# Patient Record
Sex: MALE | Race: WHITE | NOT HISPANIC OR LATINO
[De-identification: names, ages, dates, MRNs, and addresses within clinical notes are randomized per-mention and may not be internally consistent; named-entity substitution may affect disease eponyms.]

---

## 2019-11-04 ENCOUNTER — APPOINTMENT (OUTPATIENT)
Dept: RADIOLOGY | Facility: HOSPITAL | Age: 69
End: 2019-11-04
Payer: COMMERCIAL

## 2019-11-04 ENCOUNTER — OUTPATIENT (OUTPATIENT)
Dept: OUTPATIENT SERVICES | Facility: HOSPITAL | Age: 69
LOS: 1 days | Discharge: HOME | End: 2019-11-04

## 2019-11-04 ENCOUNTER — OUTPATIENT (OUTPATIENT)
Dept: OUTPATIENT SERVICES | Facility: HOSPITAL | Age: 69
LOS: 1 days | End: 2019-11-04

## 2019-11-04 ENCOUNTER — APPOINTMENT (OUTPATIENT)
Dept: SPEECH THERAPY | Facility: HOSPITAL | Age: 69
End: 2019-11-04
Payer: COMMERCIAL

## 2019-11-04 DIAGNOSIS — G72.41 INCLUSION BODY MYOSITIS [IBM]: ICD-10-CM

## 2019-11-04 PROBLEM — Z00.00 ENCOUNTER FOR PREVENTIVE HEALTH EXAMINATION: Status: ACTIVE | Noted: 2019-11-04

## 2019-11-04 PROCEDURE — 74230 X-RAY XM SWLNG FUNCJ C+: CPT | Mod: 26

## 2019-11-04 NOTE — PLAN
[FreeTextEntry2] : \par 1.) Soft Solids with Thin Liquids\par 2.) Feeding/Swallowing Guidelines: Sit upright, small bites, chew soft solids well, single cup sips of thin liquids; two swallows per bite; alternate with a liquid wash after every bite; reswallow per every sip. \par 3.) Aspiration Precautions\par 4.) Maintain Good Oral Hygiene Care\par 5.) Follow up with referring Physician\par 6.) Consider swallowing therapy to maximize swallow mechanism \par 7.) Crush PO meds if not contraindicated with applesauce or consider liquid form if applicable. \par \par SLP provided Patient/Wife education regarding preliminary results. Both verbalized understanding and will follow up with Physician for medical management. \par

## 2019-11-04 NOTE — ASSESSMENT
[FreeTextEntry1] : Patient presents with Functional Oral and Mild to Moderate Pharyngeal Stage Dysphagia. The Oral Stage is characterized by adequate oral containment, adequate chewing for solid, adequate bolus manipulation, adequate tongue motion with adequate anterior to posterior transfer of the bolus; piecemeal transfer and deglutition for solids; with adequate oral clearance post swallow.    The Pharyngeal Stage is characterized by adequate initiation of the pharyngeal swallow, adequate laryngeal elevation, reduced tongue base retraction resulting in moderate vallecular residue post primary swallow for puree/solids; trace/mild for thin liquids and reduced pharyngeal constriction resulting in mild pyriforms/lateral pharyngeal wall post primary swallow for puree/solids; trace/mild for thin liquids. There is mild to moderate pharyngeal clearance deficits located in the vallecular/pyriforms post primary swallow for puree/solids. A liquid wash benefits to assist with pharyngeal clearance for puree/solids. A reswallow benefits to assist with pharyngeal clearance for thin liquids.   There was No Aspiration observed before, during or after the swallow for puree/solids/thin liquids. \par \par Of Note: An Esophageal Screen was performed. Patient was given a Barium Tablet with a cup of water. The Barium Tablet was observed to course through the pharynx/esophagus without hold up. This cannot be considered as a full complete evaluation of the esophagus. \par \par

## 2019-11-04 NOTE — HISTORY OF PRESENT ILLNESS
[FreeTextEntry1] : Patient arrived to Radiology for an OutPatient Modified Barium Swallow Study. Patient was accompanied by his wife. Patient is a 69 y/o male with PMH: Common Variable Immune Deficiency, Bronchiectasis, Asthma, and Inclusion Body Myositis. Patient reports recent dysphagia like symptoms that began six months ago "feels like choking; large pills getting caught in my throat, catch a little bit in my throat" Patient reports no current/repeated Pneumonia. Patient reports unintentional weight loss of 10 lbs over the past year. Patient eats a Regular with Thin Liquids.   This Modified Barium Swallow Study is to objectively assess the Physiology of the Oral and Pharyngeal Stage swallowing mechanism for treatment plan for least restrictive diet. \par \par Referring MD: Dr. Tom Berkowitz II, MD, PhD\par [facility], [facility]\par Fax: [phone]\par

## 2019-11-06 DIAGNOSIS — R13.12 DYSPHAGIA, OROPHARYNGEAL PHASE: ICD-10-CM

## 2019-11-16 ENCOUNTER — TRANSCRIPTION ENCOUNTER (OUTPATIENT)
Age: 69
End: 2019-11-16

## 2020-09-11 ENCOUNTER — APPOINTMENT (OUTPATIENT)
Dept: RADIOLOGY | Facility: HOSPITAL | Age: 70
End: 2020-09-11

## 2022-04-04 ENCOUNTER — APPOINTMENT (OUTPATIENT)
Dept: OTOLARYNGOLOGY | Facility: CLINIC | Age: 72
End: 2022-04-04
Payer: MEDICARE

## 2022-04-04 VITALS
BODY MASS INDEX: 16.48 KG/M2 | HEIGHT: 67 IN | SYSTOLIC BLOOD PRESSURE: 115 MMHG | HEART RATE: 62 BPM | WEIGHT: 105 LBS | DIASTOLIC BLOOD PRESSURE: 62 MMHG

## 2022-04-04 DIAGNOSIS — H91.93 UNSPECIFIED HEARING LOSS, BILATERAL: ICD-10-CM

## 2022-04-04 DIAGNOSIS — H90.5 UNSPECIFIED SENSORINEURAL HEARING LOSS: ICD-10-CM

## 2022-04-04 DIAGNOSIS — Z87.09 PERSONAL HISTORY OF OTHER DISEASES OF THE RESPIRATORY SYSTEM: ICD-10-CM

## 2022-04-04 DIAGNOSIS — Z80.9 FAMILY HISTORY OF MALIGNANT NEOPLASM, UNSPECIFIED: ICD-10-CM

## 2022-04-04 DIAGNOSIS — G72.41 INCLUSION BODY MYOSITIS [IBM]: ICD-10-CM

## 2022-04-04 PROCEDURE — 99204 OFFICE O/P NEW MOD 45 MIN: CPT

## 2022-04-04 PROCEDURE — 92584 ELECTROCOCHLEOGRAPHY: CPT

## 2022-04-04 PROCEDURE — 92557 COMPREHENSIVE HEARING TEST: CPT

## 2022-04-04 PROCEDURE — 92567 TYMPANOMETRY: CPT

## 2022-04-04 RX ORDER — AZITHROMYCIN 250 MG/1
TABLET, FILM COATED ORAL
Refills: 0 | Status: ACTIVE | COMMUNITY

## 2022-04-08 LAB
CRP SERPL-MCNC: <3 MG/L
ERYTHROCYTE [SEDIMENTATION RATE] IN BLOOD BY WESTERGREN METHOD: 30 MM/HR
IL6 SERPL-MCNC: <2.5 PG/ML
TRYPTASE: 5.1 UG/L

## 2022-04-21 NOTE — REASON FOR VISIT
[Initial Consultation] : an initial consultation for [FreeTextEntry2] : referred by Dr. Demetrio Levine for possible autoimmune problem

## 2022-04-21 NOTE — DATA REVIEWED
[de-identified] : Bilateral -mild to severe sensorineural hearing loss\par Impedance testing reveals normal Type A tympanograms bilaterally\par Ecog - negative AU\par Right - 28%\par Left - 15%

## 2022-04-21 NOTE — CONSULT LETTER
[Dear  ___] : Dear  [unfilled], [Consult Letter:] : I had the pleasure of evaluating your patient, [unfilled]. [Please see my note below.] : Please see my note below. [Consult Closing:] : Thank you very much for allowing me to participate in the care of this patient.  If you have any questions, please do not hesitate to contact me. [Sincerely,] : Sincerely, [FreeTextEntry2] : Demetrio Levine MD [FreeTextEntry3] : Alphonse Bardales MD, FACS\par Chair of Otolaryngology, [facility]\par Professor of Otolaryngology & Molecular Medicine, [facility] [employer] at [facility]\par

## 2022-04-21 NOTE — HISTORY OF PRESENT ILLNESS
[de-identified] : 71 year old male referred by Dr. Demetrio Levine for possible autoimmune problem, and an undiagnosed condition.  States bilateral hearing loss for the past 4 years, currently wearing bilateral hearing aids.  Constant bilateral tinnitus for the past 4 years.  Undx chronic illness - at first no hearing loss - chronic fatigue - preior had recurrent sinusitis and recurrent otitis - both ears equally - saw ENT in [facility] - felt badly but no infection - no pain or pressure but felt sick - "stayed in bed x 3 months" - saw many MDs - couldn't find anything - currently saw MD for chronic illness - ? 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

## 2022-04-21 NOTE — PHYSICAL EXAM
[Midline] : trachea located in midline position [Normal] : no rashes [de-identified] : wax removed AU

## 2022-05-24 ENCOUNTER — APPOINTMENT (OUTPATIENT)
Dept: PEDIATRIC ALLERGY IMMUNOLOGY | Facility: CLINIC | Age: 72
End: 2022-05-24
Payer: MEDICARE

## 2022-05-24 VITALS
DIASTOLIC BLOOD PRESSURE: 75 MMHG | SYSTOLIC BLOOD PRESSURE: 137 MMHG | WEIGHT: 112.99 LBS | OXYGEN SATURATION: 100 % | BODY MASS INDEX: 17.73 KG/M2 | HEIGHT: 67 IN | TEMPERATURE: 97.16 F | HEART RATE: 72 BPM

## 2022-05-24 DIAGNOSIS — Z13.0 ENCOUNTER FOR SCREENING FOR OTHER SUSPECTED ENDOCRINE DISORDER: ICD-10-CM

## 2022-05-24 DIAGNOSIS — D83.9 COMMON VARIABLE IMMUNODEFICIENCY, UNSPECIFIED: ICD-10-CM

## 2022-05-24 DIAGNOSIS — Z13.29 ENCOUNTER FOR SCREENING FOR OTHER SUSPECTED ENDOCRINE DISORDER: ICD-10-CM

## 2022-05-24 DIAGNOSIS — Z13.228 ENCOUNTER FOR SCREENING FOR OTHER SUSPECTED ENDOCRINE DISORDER: ICD-10-CM

## 2022-05-24 PROCEDURE — 99204 OFFICE O/P NEW MOD 45 MIN: CPT

## 2022-05-24 RX ORDER — PREDNISONE 50 MG/1
TABLET ORAL
Refills: 0 | Status: DISCONTINUED | COMMUNITY
End: 2022-05-24

## 2022-05-24 RX ORDER — METHOTREXATE 2.5 MG/1
TABLET ORAL
Refills: 0 | Status: DISCONTINUED | COMMUNITY
End: 2022-05-24

## 2022-05-24 RX ORDER — IMMUNE GLOBULIN 10 PERCENT (HUMAN) WITH RECOMBINANT HUMAN HYALURONIDASE 30 G/300ML
KIT SUBCUTANEOUS
Refills: 0 | Status: ACTIVE | COMMUNITY

## 2022-05-24 NOTE — HISTORY OF PRESENT ILLNESS
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 Aryeh Rubinstein), azithromycin daily for PPX, here for initial immunology consultation.\par \par For the past 5 yrs, he has been suffering from an undiagnosed chronic condition that makes him feel like he "has mononucleosis all the time" He feels poorly, has low energy. He has seen multiple specialists including Dr. Demetrio Levine who felt that he may have autoimmune ear disease (progressive b/l hearing loss, tinnitus) and referred him to Dr. Bardales. He was evaluated by Dr. Bardales who did not feel he had autoimmune ear disease. Patient states that he had elevated IL-6 "for a long time." He and Dr. Levine where thinking of targeting IL-6 pathway. On recheck with Dr. Bardales in 4/2022, IL-6 was wnl.\par \par He follows with Dr. Rubinstein for his CVID. He was previously on IVIG for many years but has been on Hyqvia 50gm q3wks for a few years now. He has prior hx of recurrent sinusitis and otitis media. he currently does not complain of recurrent or deep seated or unusual infections.\par \par Dr. Bardales recommended possible evaluation for mast cell disorder. he denies hx of allergies (to food, drugs, environment) or brain fog or hives or rashes. He denies history of known dysautonomia or POTS or Kevin Danlos Syndrome. His tryptase from 4/2022 was wnl (5.1)\par \par \par \par \par Sees Aryeh Rubinstein for CVID

## 2022-05-24 NOTE — CONSULT LETTER
[Dear  ___] : Dear  [unfilled], [Consult Letter:] : I had the pleasure of evaluating your patient, [unfilled]. [Please see my note below.] : Please see my note below. [Consult Closing:] : Thank you very much for allowing me to participate in the care of this patient.  If you have any questions, please do not hesitate to contact me. [Sincerely,] : Sincerely, [FreeTextEntry3] : Hamlet Pickett III  MPH, MD, PhD, FACP, FACAAI, FAAAAI \par , Departments of Medicine and Pediatrics \par [employer] [facility] [employer] at [facility] \par [place] [employer], [facility] \par Attending Physician, Division of Allergy & Immunology [facility]\par \par \par  [DrMarjorie  ___] : Dr. CAMPOS [DrMarjorie ___] : Dr. CAMPOS

## 2022-05-24 NOTE — PHYSICAL EXAM
[Alert] : alert [No Acute Distress] : no acute distress [Normal Voice/Communication] : normal voice communication [Normal Pupil & Iris Size/Symmetry] : normal pupil and iris size and symmetry [No Discharge] : no discharge [No Photophobia] : no photophobia [Sclera Not Icteric] : sclera not icteric [Conjunctival Erythema] : no conjunctival erythema [Suborbital Bogginess] : no suborbital bogginess (allergic shiners) [Normal TMs] : both tympanic membranes were normal [Normal Nasal Mucosa] : the nasal mucosa was normal [Normal Lips/Tongue] : the lips and tongue were normal [Normal Outer Ear/Nose] : the ears and nose were normal in appearance [Normal Tonsils] : normal tonsils [No Thrush] : no thrush [Pale mucosa] : no pale mucosa [Boggy Nasal Turbinates] : no boggy and/or pale nasal turbinates [Posterior Pharyngeal Cobblestoning] : no posterior pharyngeal cobblestoning [Supple] : the neck was supple [Normal Rate and Effort] : normal respiratory rhythm and effort [No Crackles] : no crackles [No Retractions] : no retractions [Bilateral Audible Breath Sounds] : bilateral audible breath sounds [Wheezing] : no wheezing was heard [Normal Rate] : heart rate was normal  [Normal S1, S2] : normal S1 and S2 [No murmur] : no murmur [Regular Rhythm] : with a regular rhythm [Soft] : abdomen soft [Not Tender] : non-tender [Not Distended] : not distended [No HSM] : no hepato-splenomegaly [Normal Cervical Lymph Nodes] : cervical [Skin Intact] : skin intact  [No Rash] : no rash [No Skin Lesions] : no skin lesions [No clubbing] : no clubbing [No Edema] : no edema [No Cyanosis] : no cyanosis [No Motor Deficits] : the motor exam was normal [Normal Mood] : mood was normal [Normal Affect] : affect was normal [Alert, Awake, Oriented as Age-Appropriate] : alert, awake, oriented as age appropriate

## 2022-05-24 NOTE — REASON FOR VISIT
[Initial Consultation] : an initial consultation for [Immune Evaluation] : immune evaluation [Spouse] : spouse

## 2022-05-24 NOTE — SOCIAL HISTORY
[Spouse/Partner] : spouse/partner [House] : [unfilled] lives in a house  [Central Forced Air] : heating provided by central forced air [Central] : air conditioning provided by central unit [Dust Mite Covers] : does not have dust mite covers [Feather Pillows] : does not have feather pillows [Feather Comforter] : does not have a feather comforter [] :  [Smokers in Household] : there are no smokers in the home

## 2023-08-05 ENCOUNTER — TRANSCRIPTION ENCOUNTER (OUTPATIENT)
Age: 73
End: 2023-08-05

## 2024-02-24 ENCOUNTER — RESULT REVIEW (OUTPATIENT)
Age: 74
End: 2024-02-24

## 2024-02-26 ENCOUNTER — TRANSCRIPTION ENCOUNTER (OUTPATIENT)
Age: 74
End: 2024-02-26

## 2024-07-27 ENCOUNTER — TRANSCRIPTION ENCOUNTER (OUTPATIENT)
Age: 74
End: 2024-07-27

## 2025-01-11 ENCOUNTER — RESULT REVIEW (OUTPATIENT)
Age: 75
End: 2025-01-11

## 2025-01-11 ENCOUNTER — TRANSCRIPTION ENCOUNTER (OUTPATIENT)
Age: 75
End: 2025-01-11

## 2025-03-21 ENCOUNTER — NON-APPOINTMENT (OUTPATIENT)
Age: 75
End: 2025-03-21

## 2025-03-24 ENCOUNTER — TRANSCRIPTION ENCOUNTER (OUTPATIENT)
Age: 75
End: 2025-03-24

## 2025-05-19 ENCOUNTER — APPOINTMENT (OUTPATIENT)
Dept: OTOLARYNGOLOGY | Facility: CLINIC | Age: 75
End: 2025-05-19

## 2025-05-19 PROCEDURE — 99203 OFFICE O/P NEW LOW 30 MIN: CPT

## 2025-05-19 PROCEDURE — 92567 TYMPANOMETRY: CPT

## 2025-05-19 PROCEDURE — 92557 COMPREHENSIVE HEARING TEST: CPT

## 2025-05-19 RX ORDER — AZITHROMYCIN 250 MG/1
TABLET, FILM COATED ORAL
Refills: 0 | Status: ACTIVE | COMMUNITY